# Patient Record
Sex: MALE | Race: WHITE | NOT HISPANIC OR LATINO | Employment: OTHER | URBAN - METROPOLITAN AREA
[De-identification: names, ages, dates, MRNs, and addresses within clinical notes are randomized per-mention and may not be internally consistent; named-entity substitution may affect disease eponyms.]

---

## 2019-06-24 ENCOUNTER — OFFICE VISIT (OUTPATIENT)
Dept: GASTROENTEROLOGY | Facility: CLINIC | Age: 74
End: 2019-06-24
Payer: COMMERCIAL

## 2019-06-24 VITALS
HEIGHT: 73 IN | RESPIRATION RATE: 17 BRPM | BODY MASS INDEX: 25.2 KG/M2 | HEART RATE: 118 BPM | DIASTOLIC BLOOD PRESSURE: 90 MMHG | WEIGHT: 190.13 LBS | SYSTOLIC BLOOD PRESSURE: 138 MMHG | OXYGEN SATURATION: 98 %

## 2019-06-24 DIAGNOSIS — K21.9 GASTROESOPHAGEAL REFLUX DISEASE WITHOUT ESOPHAGITIS: Primary | ICD-10-CM

## 2019-06-24 DIAGNOSIS — Z86.010 HISTORY OF COLON POLYPS: ICD-10-CM

## 2019-06-24 PROBLEM — Z86.0100 HISTORY OF COLON POLYPS: Status: ACTIVE | Noted: 2019-06-24

## 2019-06-24 PROCEDURE — 99999 PR PBB SHADOW E&M-NEW PATIENT-LVL III: ICD-10-PCS | Mod: PBBFAC,,, | Performed by: INTERNAL MEDICINE

## 2019-06-24 PROCEDURE — 1101F PR PT FALLS ASSESS DOC 0-1 FALLS W/OUT INJ PAST YR: ICD-10-PCS | Mod: S$GLB,,, | Performed by: INTERNAL MEDICINE

## 2019-06-24 PROCEDURE — 99203 PR OFFICE/OUTPT VISIT, NEW, LEVL III, 30-44 MIN: ICD-10-PCS | Mod: S$GLB,,, | Performed by: INTERNAL MEDICINE

## 2019-06-24 PROCEDURE — 99999 PR PBB SHADOW E&M-NEW PATIENT-LVL III: CPT | Mod: PBBFAC,,, | Performed by: INTERNAL MEDICINE

## 2019-06-24 PROCEDURE — 1101F PT FALLS ASSESS-DOCD LE1/YR: CPT | Mod: S$GLB,,, | Performed by: INTERNAL MEDICINE

## 2019-06-24 PROCEDURE — 99203 OFFICE O/P NEW LOW 30 MIN: CPT | Mod: S$GLB,,, | Performed by: INTERNAL MEDICINE

## 2019-06-24 RX ORDER — METFORMIN HYDROCHLORIDE 500 MG/1
500 TABLET, EXTENDED RELEASE ORAL 2 TIMES DAILY
COMMUNITY
Start: 2019-04-01 | End: 2022-11-18 | Stop reason: SDUPTHER

## 2019-06-24 RX ORDER — SIMVASTATIN 40 MG/1
1 TABLET, FILM COATED ORAL DAILY
COMMUNITY
Start: 2019-05-29 | End: 2022-11-18 | Stop reason: SDUPTHER

## 2019-06-24 RX ORDER — LOSARTAN POTASSIUM AND HYDROCHLOROTHIAZIDE 12.5; 1 MG/1; MG/1
1 TABLET ORAL DAILY
COMMUNITY
Start: 2019-04-29 | End: 2022-11-18 | Stop reason: SDUPTHER

## 2019-06-24 RX ORDER — AMLODIPINE BESYLATE 5 MG/1
5 TABLET ORAL DAILY
COMMUNITY
Start: 2019-04-29 | End: 2022-11-18 | Stop reason: SDUPTHER

## 2019-06-24 NOTE — PATIENT INSTRUCTIONS
He will continue his ADA diet and current medications.  He will try to pinpoint a reason for the frequent clearing of the throat.  A barium swallow and upper GI series will be obtained. He has had colonoscopies in the past with history of polyps but he denies colon symptoms.  He will decide whether he ever wants of the colonoscopy at his age.

## 2019-06-24 NOTE — PROGRESS NOTES
Subjective:       Patient ID: Deniz Horowitz is a 73 y.o. male.    Chief Complaint: Establish Care    His wife states he has frequent clearing of his throat.  He denies aspiration or cardiopulmonary symptoms.  He has a strong family history of heart disease and hyperlipidemia but he denies cardiopulmonary symptoms.  He states his diabetes is well controlled.  He denies dysphagia jaundice or bleeding.  In the past he has had colonoscopies which revealed hemorrhoids but he states that since he is not having colon symptoms he does not want another colonoscopy.  He denies jaundice GI bleeding dysphagia aspiration.  He has a physically active man who is retired and plays golf.      Allergies:  Review of patient's allergies indicates:  No Known Allergies    Medications:    Current Outpatient Medications:     amLODIPine (NORVASC) 5 MG tablet, Take 5 mg by mouth once daily., Disp: , Rfl:     losartan-hydrochlorothiazide 100-12.5 mg (HYZAAR) 100-12.5 mg Tab, Take 1 tablet by mouth once daily., Disp: , Rfl:     metFORMIN (GLUCOPHAGE-XR) 500 MG 24 hr tablet, Take 500 mg by mouth 2 (two) times daily., Disp: , Rfl:     simvastatin (ZOCOR) 40 MG tablet, Take 1 tablet by mouth once daily., Disp: , Rfl:     History reviewed. No pertinent past medical history.    History reviewed. No pertinent surgical history.      Review of Systems   Constitutional: Negative for appetite change, fever and unexpected weight change.   HENT: Negative for trouble swallowing.         No jaundice.   Respiratory: Negative for cough, shortness of breath and wheezing.         No Rales, Rhonchi, or Dyspnea.   Cardiovascular: Negative for chest pain.        Denies cardiopulmonary symptoms.  He states his hypertension hyperlipidemia well controlled.   Gastrointestinal: Negative for abdominal distention, abdominal pain, anal bleeding, blood in stool, constipation, diarrhea, nausea and rectal pain.        He states that he clears his throat is not  associated with pulmonary symptoms of coughing.  He denies pyrosis aspiration or dysphagia.  He does not associated with the specific food or musculoskeletal action   Musculoskeletal: Negative for back pain and neck pain.   Skin: Negative for pallor and rash.   Neurological: Negative for dizziness, seizures, syncope, speech difficulty, weakness and numbness.   Hematological: Negative for adenopathy.   Psychiatric/Behavioral: Negative for confusion.       Objective:      Physical Exam   Constitutional: He is oriented to person, place, and time. He appears well-developed and well-nourished.   Well-nourished well-hydrated nonicteric white male   HENT:   Head: Normocephalic.   Eyes: Pupils are equal, round, and reactive to light. EOM are normal.   Neck: Normal range of motion. Neck supple. No tracheal deviation present. No thyromegaly present.   Carotid bruits are absent   Cardiovascular: Normal rate, regular rhythm and normal heart sounds.   Pulmonary/Chest: Effort normal and breath sounds normal.   Abdominal: Soft. Bowel sounds are normal. He exhibits no distension and no mass. There is no tenderness. There is no rebound and no guarding. No hernia.   The abdomen is soft mildly obese without tenderness masses or organomegaly.  Bowel sounds are normal. Head   Musculoskeletal: Normal range of motion.   Lymphadenopathy:     He has no cervical adenopathy.   Neurological: He is alert and oriented to person, place, and time. No cranial nerve deficit.   Skin: Skin is warm and dry.   Psychiatric: He has a normal mood and affect. His behavior is normal.   Vitals reviewed.        Plan:       Gastroesophageal reflux disease without esophagitis    History of colon polyps    Other orders  -     FL Upper GI With Small Bowel; Future; Expected date: 06/24/2019

## 2019-07-09 ENCOUNTER — HOSPITAL ENCOUNTER (OUTPATIENT)
Dept: RADIOLOGY | Facility: HOSPITAL | Age: 74
Discharge: HOME | End: 2019-07-09
Attending: INTERNAL MEDICINE
Payer: COMMERCIAL

## 2019-07-09 PROCEDURE — 74245 FL UPPER GI WITH SMALL BOWEL: ICD-10-PCS | Mod: 26,,, | Performed by: RADIOLOGY

## 2019-07-09 PROCEDURE — 74245 FL UPPER GI WITH SMALL BOWEL: CPT | Mod: TC,FY

## 2019-07-09 PROCEDURE — 25500020 PHARM REV CODE 255: Performed by: INTERNAL MEDICINE

## 2019-07-09 PROCEDURE — 74245 FL UPPER GI WITH SMALL BOWEL: CPT | Mod: 26,,, | Performed by: RADIOLOGY

## 2019-07-09 PROCEDURE — A9698 NON-RAD CONTRAST MATERIALNOC: HCPCS | Performed by: INTERNAL MEDICINE

## 2019-07-09 RX ADMIN — BARIUM SULFATE 140 ML: 980 POWDER, FOR SUSPENSION ORAL at 10:07

## 2020-10-02 DIAGNOSIS — E11.9 DIABETES: ICD-10-CM

## 2020-10-02 DIAGNOSIS — E78.5 HYPERLIPIDEMIA: ICD-10-CM

## 2020-10-02 DIAGNOSIS — I10 HYPERTENSION: ICD-10-CM

## 2020-10-02 DIAGNOSIS — R94.31 ECG ABNORMAL: Primary | ICD-10-CM

## 2020-11-10 ENCOUNTER — HOSPITAL ENCOUNTER (OUTPATIENT)
Dept: RADIOLOGY | Facility: HOSPITAL | Age: 75
Discharge: HOME | End: 2020-11-10
Attending: INTERNAL MEDICINE
Payer: MEDICARE

## 2020-11-10 DIAGNOSIS — R94.31 ECG ABNORMAL: ICD-10-CM

## 2020-11-10 DIAGNOSIS — E11.9 DIABETES: ICD-10-CM

## 2020-11-10 DIAGNOSIS — I10 HYPERTENSION: ICD-10-CM

## 2020-11-10 DIAGNOSIS — E78.5 HYPERLIPIDEMIA: ICD-10-CM

## 2020-11-10 PROCEDURE — 71046 XR CHEST PA AND LATERAL: ICD-10-PCS | Mod: 26,,, | Performed by: RADIOLOGY

## 2020-11-10 PROCEDURE — 71046 X-RAY EXAM CHEST 2 VIEWS: CPT | Mod: TC,FY

## 2020-11-10 PROCEDURE — 71046 X-RAY EXAM CHEST 2 VIEWS: CPT | Mod: 26,,, | Performed by: RADIOLOGY

## 2021-02-11 ENCOUNTER — LAB VISIT (OUTPATIENT)
Dept: LAB | Facility: HOSPITAL | Age: 76
End: 2021-02-11
Attending: INTERNAL MEDICINE
Payer: MEDICARE

## 2021-02-11 DIAGNOSIS — E11.9 DIABETES MELLITUS WITHOUT COMPLICATION: Primary | ICD-10-CM

## 2021-02-11 LAB
ESTIMATED AVG GLUCOSE: 146 MG/DL (ref 68–131)
HBA1C MFR BLD: 6.7 % (ref 4.5–6.2)

## 2021-02-11 PROCEDURE — 83036 HEMOGLOBIN GLYCOSYLATED A1C: CPT

## 2021-02-11 PROCEDURE — 36415 COLL VENOUS BLD VENIPUNCTURE: CPT

## 2022-11-18 ENCOUNTER — OFFICE VISIT (OUTPATIENT)
Dept: FAMILY MEDICINE | Facility: CLINIC | Age: 77
End: 2022-11-18
Payer: MEDICARE

## 2022-11-18 VITALS
OXYGEN SATURATION: 98 % | BODY MASS INDEX: 24.41 KG/M2 | WEIGHT: 184.19 LBS | HEIGHT: 73 IN | DIASTOLIC BLOOD PRESSURE: 68 MMHG | SYSTOLIC BLOOD PRESSURE: 130 MMHG | TEMPERATURE: 98 F | RESPIRATION RATE: 16 BRPM | HEART RATE: 78 BPM

## 2022-11-18 DIAGNOSIS — E11.59 HYPERTENSION ASSOCIATED WITH DIABETES: ICD-10-CM

## 2022-11-18 DIAGNOSIS — I15.2 HYPERTENSION ASSOCIATED WITH DIABETES: ICD-10-CM

## 2022-11-18 DIAGNOSIS — Z11.59 ENCOUNTER FOR HEPATITIS C SCREENING TEST FOR LOW RISK PATIENT: ICD-10-CM

## 2022-11-18 DIAGNOSIS — E11.9 TYPE 2 DIABETES MELLITUS WITHOUT COMPLICATION, WITHOUT LONG-TERM CURRENT USE OF INSULIN: ICD-10-CM

## 2022-11-18 DIAGNOSIS — Z11.4 SCREENING FOR HIV (HUMAN IMMUNODEFICIENCY VIRUS): ICD-10-CM

## 2022-11-18 DIAGNOSIS — Z00.00 ANNUAL PHYSICAL EXAM: Primary | ICD-10-CM

## 2022-11-18 DIAGNOSIS — E78.5 HYPERLIPIDEMIA ASSOCIATED WITH TYPE 2 DIABETES MELLITUS: ICD-10-CM

## 2022-11-18 DIAGNOSIS — E11.69 HYPERLIPIDEMIA ASSOCIATED WITH TYPE 2 DIABETES MELLITUS: ICD-10-CM

## 2022-11-18 PROBLEM — I10 ESSENTIAL HYPERTENSION: Status: ACTIVE | Noted: 2022-11-18

## 2022-11-18 PROCEDURE — 99999 PR PBB SHADOW E&M-EST. PATIENT-LVL III: ICD-10-PCS | Mod: PBBFAC,,, | Performed by: FAMILY MEDICINE

## 2022-11-18 PROCEDURE — 99397 PER PM REEVAL EST PAT 65+ YR: CPT | Mod: S$PBB,GZ,, | Performed by: FAMILY MEDICINE

## 2022-11-18 PROCEDURE — 99213 OFFICE O/P EST LOW 20 MIN: CPT | Mod: PBBFAC | Performed by: FAMILY MEDICINE

## 2022-11-18 PROCEDURE — 99999 PR PBB SHADOW E&M-EST. PATIENT-LVL III: CPT | Mod: PBBFAC,,, | Performed by: FAMILY MEDICINE

## 2022-11-18 PROCEDURE — 99397 PR PREVENTIVE VISIT,EST,65 & OVER: ICD-10-PCS | Mod: S$PBB,GZ,, | Performed by: FAMILY MEDICINE

## 2022-11-18 RX ORDER — METFORMIN HYDROCHLORIDE 500 MG/1
500 TABLET, EXTENDED RELEASE ORAL 2 TIMES DAILY
Qty: 180 TABLET | Refills: 3 | Status: SHIPPED | OUTPATIENT
Start: 2022-11-18 | End: 2024-02-15 | Stop reason: SDUPTHER

## 2022-11-18 RX ORDER — AMLODIPINE BESYLATE 5 MG/1
5 TABLET ORAL DAILY
Qty: 90 TABLET | Refills: 3 | Status: SHIPPED | OUTPATIENT
Start: 2022-11-18 | End: 2024-02-15

## 2022-11-18 RX ORDER — SIMVASTATIN 40 MG/1
40 TABLET, FILM COATED ORAL DAILY
Qty: 90 TABLET | Refills: 3 | Status: SHIPPED | OUTPATIENT
Start: 2022-11-18 | End: 2024-01-11 | Stop reason: SDUPTHER

## 2022-11-18 RX ORDER — LOSARTAN POTASSIUM AND HYDROCHLOROTHIAZIDE 12.5; 1 MG/1; MG/1
1 TABLET ORAL DAILY
Qty: 90 TABLET | Refills: 3 | Status: SHIPPED | OUTPATIENT
Start: 2022-11-18 | End: 2024-02-15

## 2022-11-18 NOTE — PROGRESS NOTES
"[facility] - Clinic Note    Subjective      Mr. Horowitz is a 77 y.o. male who presents to clinic for Establish Care    Patient has a history of type 2 diabetes, hypertension, hyperlipidemia.  He currently takes amlodipine, losartan-hydrochlorothiazide, metformin, simvastatin.  Chronic conditions are well controlled.  He has no other complaints at this time.    PMH Deniz has no past medical history on file.   PSXH Deniz has no past surgical history on file.    Deniz's family history is not on file.    Deniz reports that he has been smoking cigarettes. He has never used smokeless tobacco. No history on file for alcohol use and drug use.   ALG Deniz has No Known Allergies.   MED Deniz has a current medication list which includes the following prescription(s): amlodipine, losartan-hydrochlorothiazide 100-12.5 mg, metformin, and simvastatin.     Review of Systems   Constitutional:  Negative for chills and fever.   HENT:  Negative for congestion and rhinorrhea.    Eyes:  Negative for visual disturbance.   Respiratory:  Negative for cough and shortness of breath.    Cardiovascular:  Negative for chest pain.   Gastrointestinal:  Negative for abdominal pain, constipation, diarrhea, nausea and vomiting.   Genitourinary:  Negative for dysuria.   Musculoskeletal:  Negative for myalgias.   Skin:  Negative for rash.   Neurological:  Negative for weakness and headaches.   Objective     /68 (BP Location: Right arm, Patient Position: Sitting, BP Method: Large (Manual))   Pulse 78   Temp 97.6 °F (36.4 °C) (Temporal)   Resp 16   Ht 6' 1" (1.854 m)   Wt 83.6 kg (184 lb 3.2 oz)   SpO2 98%   BMI 24.30 kg/m²     Physical Exam  Vitals and nursing note reviewed.   Constitutional:       General: He is not in acute distress.     Appearance: Normal appearance. He is well-developed. He is not diaphoretic.   HENT:      Head: Normocephalic and atraumatic.      Right Ear: External ear normal.      Left Ear: " External ear normal.   Eyes:      General:         Right eye: No discharge.         Left eye: No discharge.   Cardiovascular:      Rate and Rhythm: Normal rate and regular rhythm.      Heart sounds: Normal heart sounds.   Pulmonary:      Effort: Pulmonary effort is normal.      Breath sounds: Normal breath sounds. No wheezing or rales.   Skin:     General: Skin is warm and dry.   Neurological:      Mental Status: He is alert and oriented to person, place, and time. Mental status is at baseline.   Psychiatric:         Mood and Affect: Mood normal.         Behavior: Behavior normal.         Thought Content: Thought content normal.         Judgment: Judgment normal.      Assessment/Plan     1. Annual physical exam        2. Type 2 diabetes mellitus without complication, without long-term current use of insulin  Lipid Panel    Comprehensive Metabolic Panel    CBC Auto Differential    Hemoglobin A1C    metFORMIN (GLUCOPHAGE-XR) 500 MG ER 24hr tablet      3. Screening for HIV (human immunodeficiency virus)  HIV 1/2 Ag/Ab (4th Gen)      4. Encounter for hepatitis C screening test for low risk patient  Hepatitis C Antibody      5. Hypertension associated with diabetes  losartan-hydrochlorothiazide 100-12.5 mg (HYZAAR) 100-12.5 mg Tab    amLODIPine (NORVASC) 5 MG tablet      6. Hyperlipidemia associated with type 2 diabetes mellitus  simvastatin (ZOCOR) 40 MG tablet        Continue current medications as prescribed.  Check labs as above.  Follow-up in 6 months.    No future appointments.      Warner Phillips MD  Family Medicine  [facility]

## 2023-01-04 ENCOUNTER — OFFICE VISIT (OUTPATIENT)
Dept: FAMILY MEDICINE | Facility: CLINIC | Age: 78
End: 2023-01-04
Payer: MEDICARE

## 2023-01-04 VITALS
HEART RATE: 112 BPM | RESPIRATION RATE: 16 BRPM | TEMPERATURE: 98 F | BODY MASS INDEX: 24.47 KG/M2 | DIASTOLIC BLOOD PRESSURE: 68 MMHG | SYSTOLIC BLOOD PRESSURE: 118 MMHG | HEIGHT: 73 IN | WEIGHT: 184.63 LBS | OXYGEN SATURATION: 96 %

## 2023-01-04 DIAGNOSIS — L89.321 PRESSURE INJURY OF LEFT BUTTOCK, STAGE 1: Primary | ICD-10-CM

## 2023-01-04 PROCEDURE — 99213 OFFICE O/P EST LOW 20 MIN: CPT | Mod: S$PBB,,, | Performed by: FAMILY MEDICINE

## 2023-01-04 PROCEDURE — 99999 PR PBB SHADOW E&M-EST. PATIENT-LVL III: CPT | Mod: PBBFAC,,, | Performed by: FAMILY MEDICINE

## 2023-01-04 PROCEDURE — 99213 PR OFFICE/OUTPT VISIT, EST, LEVL III, 20-29 MIN: ICD-10-PCS | Mod: S$PBB,,, | Performed by: FAMILY MEDICINE

## 2023-01-04 PROCEDURE — 99999 PR PBB SHADOW E&M-EST. PATIENT-LVL III: ICD-10-PCS | Mod: PBBFAC,,, | Performed by: FAMILY MEDICINE

## 2023-01-04 PROCEDURE — 99213 OFFICE O/P EST LOW 20 MIN: CPT | Mod: PBBFAC | Performed by: FAMILY MEDICINE

## 2023-01-04 RX ORDER — MUPIROCIN 20 MG/G
OINTMENT TOPICAL 3 TIMES DAILY
Qty: 30 G | Refills: 0 | Status: SHIPPED | OUTPATIENT
Start: 2023-01-04 | End: 2023-07-03

## 2023-01-04 NOTE — PROGRESS NOTES
"[facility] - Clinic Note    Subjective      Mr. Horowitz is a 77 y.o. male who presents to clinic for Rash    Pressure injury to the left buttock.    PMH Deniz has no past medical history on file.   PSXH Deniz has no past surgical history on file.    Deniz's family history is not on file.    Deniz reports that he has been smoking cigarettes. He has never used smokeless tobacco. No history on file for alcohol use and drug use.   [facility] Deniz has No Known Allergies.   MED Deniz has a current medication list which includes the following prescription(s): amlodipine, losartan-hydrochlorothiazide 100-12.5 mg, metformin, simvastatin, and mupirocin.     Review of Systems   Constitutional:  Negative for chills and fever.   HENT:  Negative for congestion and rhinorrhea.    Eyes:  Negative for visual disturbance.   Respiratory:  Negative for cough and shortness of breath.    Cardiovascular:  Negative for chest pain.   Gastrointestinal:  Negative for abdominal pain, constipation, diarrhea, nausea and vomiting.   Genitourinary:  Negative for dysuria.   Musculoskeletal:  Negative for myalgias.   Skin:  Negative for rash.   Neurological:  Negative for weakness and headaches.   Objective     /68 (BP Location: Left arm, Patient Position: Sitting, BP Method: Large (Manual))   Pulse (!) 112   Temp 97.6 °F (36.4 °C) (Temporal)   Resp 16   Ht 6' 1" (1.854 m)   Wt 83.7 kg (184 lb 9.6 oz)   SpO2 96%   BMI 24.36 kg/m²     Physical Exam  Vitals and nursing note reviewed. Exam conducted with a chaperone present.   Constitutional:       General: He is not in acute distress.     Appearance: Normal appearance. He is well-developed. He is not diaphoretic.   HENT:      Head: Normocephalic and atraumatic.      Right Ear: External ear normal.      Left Ear: External ear normal.   Eyes:      General:         Right eye: No discharge.         Left eye: No discharge.   Cardiovascular:      Rate and Rhythm: Normal rate and " regular rhythm.      Heart sounds: Normal heart sounds.   Pulmonary:      Effort: Pulmonary effort is normal.      Breath sounds: Normal breath sounds. No wheezing or rales.   Skin:     General: Skin is warm and dry.      Comments: stage I pressure injury to left buttock   Neurological:      Mental Status: He is alert and oriented to person, place, and time. Mental status is at baseline.   Psychiatric:         Mood and Affect: Mood normal.         Behavior: Behavior normal.         Thought Content: Thought content normal.         Judgment: Judgment normal.      Assessment/Plan     1. Pressure injury of left buttock, stage 1          Does not be infected currently.  Will have patient but the Alejandra on the affected area and Calmoseptine.  Keep pressure off area.  Follow-up if symptoms fail to improve.    No future appointments.      Warner Phillips MD  Family Medicine  [facility]

## 2023-06-30 ENCOUNTER — TELEPHONE (OUTPATIENT)
Dept: FAMILY MEDICINE | Facility: CLINIC | Age: 78
End: 2023-06-30
Payer: MEDICARE

## 2023-06-30 NOTE — TELEPHONE ENCOUNTER
----- Message from Medina Villavicencio sent at 6/30/2023 10:53 AM CDT -----  Contact: Gentry  Type:  Sooner Appointment Request    Caller is requesting a sooner appointment.  Caller declined first available appointment listed below.  Caller will not accept being placed on the waitlist and is requesting a message be sent to doctor.    Name of Caller:  Gentry/ PT Son  When is the first available appointment?  7/28/23  Symptoms:  Possible Boil or Cyst on Left Forearm  Best Call Back Number:  [phone]  Additional Information:  PT son asking for him to be seen next week if possible

## 2023-07-03 ENCOUNTER — OFFICE VISIT (OUTPATIENT)
Dept: FAMILY MEDICINE | Facility: CLINIC | Age: 78
End: 2023-07-03
Payer: MEDICARE

## 2023-07-03 VITALS
BODY MASS INDEX: 25.02 KG/M2 | WEIGHT: 188.81 LBS | RESPIRATION RATE: 16 BRPM | HEIGHT: 73 IN | DIASTOLIC BLOOD PRESSURE: 78 MMHG | OXYGEN SATURATION: 96 % | SYSTOLIC BLOOD PRESSURE: 120 MMHG | TEMPERATURE: 98 F | HEART RATE: 100 BPM

## 2023-07-03 DIAGNOSIS — E11.59 HYPERTENSION ASSOCIATED WITH DIABETES: ICD-10-CM

## 2023-07-03 DIAGNOSIS — L08.9 SKIN INFECTION: Primary | ICD-10-CM

## 2023-07-03 DIAGNOSIS — N18.31 STAGE 3A CHRONIC KIDNEY DISEASE: ICD-10-CM

## 2023-07-03 DIAGNOSIS — I15.2 HYPERTENSION ASSOCIATED WITH DIABETES: ICD-10-CM

## 2023-07-03 PROCEDURE — 99214 OFFICE O/P EST MOD 30 MIN: CPT | Mod: S$PBB,,, | Performed by: FAMILY MEDICINE

## 2023-07-03 PROCEDURE — 99214 PR OFFICE/OUTPT VISIT, EST, LEVL IV, 30-39 MIN: ICD-10-PCS | Mod: S$PBB,,, | Performed by: FAMILY MEDICINE

## 2023-07-03 PROCEDURE — 99999 PR PBB SHADOW E&M-EST. PATIENT-LVL IV: ICD-10-PCS | Mod: PBBFAC,,, | Performed by: FAMILY MEDICINE

## 2023-07-03 PROCEDURE — 99999 PR PBB SHADOW E&M-EST. PATIENT-LVL IV: CPT | Mod: PBBFAC,,, | Performed by: FAMILY MEDICINE

## 2023-07-03 PROCEDURE — 99214 OFFICE O/P EST MOD 30 MIN: CPT | Mod: PBBFAC | Performed by: FAMILY MEDICINE

## 2023-07-03 RX ORDER — DOXYCYCLINE HYCLATE 100 MG
100 TABLET ORAL 2 TIMES DAILY
Qty: 20 TABLET | Refills: 0 | Status: SHIPPED | OUTPATIENT
Start: 2023-07-03 | End: 2023-07-13

## 2023-07-03 NOTE — PROGRESS NOTES
"[facility] - Clinic Note    Subjective      Mr. Horowitz is a 77 y.o. male who presents to clinic for Abscess    Right forearm skin infection.    PMH Deniz has no past medical history on file.   PSXH Deniz has no past surgical history on file.    Deniz's family history is not on file.    Deniz reports that he has been smoking cigarettes. He has never used smokeless tobacco. No history on file for alcohol use and drug use.   [facility] Deniz has No Known Allergies.   MED Deniz has a current medication list which includes the following prescription(s): amlodipine, losartan-hydrochlorothiazide 100-12.5 mg, metformin, simvastatin, and doxycycline.     Review of Systems   Constitutional:  Negative for chills and fever.   HENT:  Negative for congestion and rhinorrhea.    Eyes:  Negative for visual disturbance.   Respiratory:  Negative for cough and shortness of breath.    Cardiovascular:  Negative for chest pain.   Gastrointestinal:  Negative for abdominal pain, constipation, diarrhea, nausea and vomiting.   Genitourinary:  Negative for dysuria.   Musculoskeletal:  Negative for myalgias.   Skin:  Positive for color change. Negative for rash.   Neurological:  Negative for weakness and headaches.   Objective     /78 (BP Location: Left arm, Patient Position: Sitting, BP Method: Large (Manual))   Pulse 100   Temp 98.3 °F (36.8 °C) (Temporal)   Resp 16   Ht 6' 1" (1.854 m)   Wt 85.6 kg (188 lb 12.8 oz)   SpO2 96%   BMI 24.91 kg/m²     Physical Exam  Vitals and nursing note reviewed.   Constitutional:       General: He is not in acute distress.     Appearance: Normal appearance. He is well-developed. He is not diaphoretic.   HENT:      Head: Normocephalic and atraumatic.      Right Ear: External ear normal.      Left Ear: External ear normal.   Eyes:      General:         Right eye: No discharge.         Left eye: No discharge.   Cardiovascular:      Rate and Rhythm: Normal rate and regular rhythm.      " Heart sounds: Normal heart sounds.   Pulmonary:      Effort: Pulmonary effort is normal.      Breath sounds: Normal breath sounds. No wheezing or rales.   Skin:     General: Skin is warm and dry.      Comments: Right forearm skin infection   Neurological:      Mental Status: He is alert and oriented to person, place, and time. Mental status is at baseline.   Psychiatric:         Mood and Affect: Mood normal.         Behavior: Behavior normal.         Thought Content: Thought content normal.         Judgment: Judgment normal.      Assessment/Plan     1. Skin infection  doxycycline (VIBRA-TABS) 100 MG tablet      2. Hypertension associated with diabetes        3. Stage 3a chronic kidney disease          Expressed pus from area of infection.  Will treat with doxycycline.  Follow-up if symptoms do not improve.  TNeeds to obtain lab work for the above conditions.    Future Appointments   Date Time Provider Department Center   7/20/2023 10:00 AM Warner Phillips MD [facility]         Warner Phillips MD  Family Medicine  [facility]

## 2023-07-20 ENCOUNTER — OFFICE VISIT (OUTPATIENT)
Dept: FAMILY MEDICINE | Facility: CLINIC | Age: 78
End: 2023-07-20
Payer: MEDICARE

## 2023-07-20 VITALS
RESPIRATION RATE: 16 BRPM | BODY MASS INDEX: 25.27 KG/M2 | DIASTOLIC BLOOD PRESSURE: 68 MMHG | OXYGEN SATURATION: 97 % | WEIGHT: 190.63 LBS | HEIGHT: 73 IN | HEART RATE: 104 BPM | SYSTOLIC BLOOD PRESSURE: 132 MMHG | TEMPERATURE: 99 F

## 2023-07-20 DIAGNOSIS — L98.9 SKIN LESION: Primary | ICD-10-CM

## 2023-07-20 PROCEDURE — 99214 OFFICE O/P EST MOD 30 MIN: CPT | Mod: PBBFAC | Performed by: FAMILY MEDICINE

## 2023-07-20 PROCEDURE — 99999 PR PBB SHADOW E&M-EST. PATIENT-LVL IV: ICD-10-PCS | Mod: PBBFAC,,, | Performed by: FAMILY MEDICINE

## 2023-07-20 PROCEDURE — 99213 OFFICE O/P EST LOW 20 MIN: CPT | Mod: S$PBB,,, | Performed by: FAMILY MEDICINE

## 2023-07-20 PROCEDURE — 99999 PR PBB SHADOW E&M-EST. PATIENT-LVL IV: CPT | Mod: PBBFAC,,, | Performed by: FAMILY MEDICINE

## 2023-07-20 PROCEDURE — 99213 PR OFFICE/OUTPT VISIT, EST, LEVL III, 20-29 MIN: ICD-10-PCS | Mod: S$PBB,,, | Performed by: FAMILY MEDICINE

## 2023-07-21 NOTE — PROGRESS NOTES
"[facility] - Clinic Note    Subjective      Mr. Horowitz is a 77 y.o. male who presents to clinic for Follow-up (2wk follow up)    Right forearm skin infection.    PMH Deniz has no past medical history on file.   PSXH Deniz has no past surgical history on file.    Deniz's family history is not on file.    Deniz reports that he has been smoking cigarettes. He has never used smokeless tobacco. No history on file for alcohol use and drug use.   [facility] Deniz has No Known Allergies.   MED Deniz has a current medication list which includes the following prescription(s): amlodipine, losartan-hydrochlorothiazide 100-12.5 mg, metformin, and simvastatin.     Review of Systems   Constitutional:  Negative for chills and fever.   HENT:  Negative for congestion and rhinorrhea.    Eyes:  Negative for visual disturbance.   Respiratory:  Negative for cough and shortness of breath.    Cardiovascular:  Negative for chest pain.   Gastrointestinal:  Negative for abdominal pain, constipation, diarrhea, nausea and vomiting.   Genitourinary:  Negative for dysuria.   Musculoskeletal:  Negative for myalgias.   Skin:  Positive for color change. Negative for rash.   Neurological:  Negative for weakness and headaches.   Objective     /68 (BP Location: Left arm, Patient Position: Sitting, BP Method: Large (Manual))   Pulse 104   Temp 98.5 °F (36.9 °C) (Temporal)   Resp 16   Ht 6' 1" (1.854 m)   Wt 86.5 kg (190 lb 9.6 oz)   SpO2 97%   BMI 25.15 kg/m²     Physical Exam  Vitals and nursing note reviewed.   Constitutional:       General: He is not in acute distress.     Appearance: Normal appearance. He is well-developed. He is not diaphoretic.   HENT:      Head: Normocephalic and atraumatic.      Right Ear: External ear normal.      Left Ear: External ear normal.   Eyes:      General:         Right eye: No discharge.         Left eye: No discharge.   Cardiovascular:      Rate and Rhythm: Normal rate and regular rhythm. "      Heart sounds: Normal heart sounds.   Pulmonary:      Effort: Pulmonary effort is normal.      Breath sounds: Normal breath sounds. No wheezing or rales.   Skin:     General: Skin is warm and dry.      Comments: Right forearm skin infection   Neurological:      Mental Status: He is alert and oriented to person, place, and time. Mental status is at baseline.   Psychiatric:         Mood and Affect: Mood normal.         Behavior: Behavior normal.         Thought Content: Thought content normal.         Judgment: Judgment normal.      Assessment/Plan     1. Skin lesion  Ambulatory referral/consult to Dermatology        Lesions still present.  Attempted incision and drainage with minimal pus production.  Needs to see Dermatology for biopsy.  Referral has been placed.    No future appointments.        Warner Phillips MD  Family Medicine  [facility]

## 2024-01-04 ENCOUNTER — OFFICE VISIT (OUTPATIENT)
Dept: FAMILY MEDICINE | Facility: CLINIC | Age: 79
End: 2024-01-04
Payer: MEDICARE

## 2024-01-04 ENCOUNTER — LAB VISIT (OUTPATIENT)
Dept: LAB | Facility: CLINIC | Age: 79
End: 2024-01-04
Payer: MEDICARE

## 2024-01-04 VITALS
OXYGEN SATURATION: 94 % | BODY MASS INDEX: 24.77 KG/M2 | SYSTOLIC BLOOD PRESSURE: 92 MMHG | HEART RATE: 87 BPM | WEIGHT: 186.88 LBS | HEIGHT: 73 IN | RESPIRATION RATE: 12 BRPM | DIASTOLIC BLOOD PRESSURE: 60 MMHG

## 2024-01-04 DIAGNOSIS — E78.5 HYPERLIPIDEMIA ASSOCIATED WITH TYPE 2 DIABETES MELLITUS: ICD-10-CM

## 2024-01-04 DIAGNOSIS — R00.0 TACHYARRHYTHMIA: ICD-10-CM

## 2024-01-04 DIAGNOSIS — E78.2 MIXED HYPERLIPIDEMIA: ICD-10-CM

## 2024-01-04 DIAGNOSIS — R73.9 ELEVATED BLOOD SUGAR: ICD-10-CM

## 2024-01-04 DIAGNOSIS — Z11.59 ENCOUNTER FOR HEPATITIS C SCREENING TEST FOR LOW RISK PATIENT: ICD-10-CM

## 2024-01-04 DIAGNOSIS — I10 ESSENTIAL HYPERTENSION, BENIGN: Primary | ICD-10-CM

## 2024-01-04 DIAGNOSIS — R00.0 TACHYCARDIA: ICD-10-CM

## 2024-01-04 DIAGNOSIS — I10 ESSENTIAL HYPERTENSION, BENIGN: ICD-10-CM

## 2024-01-04 DIAGNOSIS — E11.69 HYPERLIPIDEMIA ASSOCIATED WITH TYPE 2 DIABETES MELLITUS: ICD-10-CM

## 2024-01-04 DIAGNOSIS — I95.2 HYPOTENSION DUE TO DRUGS: ICD-10-CM

## 2024-01-04 DIAGNOSIS — E11.65 INADEQUATELY CONTROLLED DIABETES MELLITUS: ICD-10-CM

## 2024-01-04 PROCEDURE — 99215 OFFICE O/P EST HI 40 MIN: CPT | Mod: S$GLB,,, | Performed by: INTERNAL MEDICINE

## 2024-01-04 PROCEDURE — 80053 COMPREHEN METABOLIC PANEL: CPT | Performed by: INTERNAL MEDICINE

## 2024-01-04 PROCEDURE — 93010 ELECTROCARDIOGRAM REPORT: CPT | Mod: S$PBB,,, | Performed by: INTERNAL MEDICINE

## 2024-01-04 PROCEDURE — 93005 ELECTROCARDIOGRAM TRACING: CPT | Mod: S$GLB,,, | Performed by: INTERNAL MEDICINE

## 2024-01-04 PROCEDURE — 86803 HEPATITIS C AB TEST: CPT | Performed by: INTERNAL MEDICINE

## 2024-01-04 PROCEDURE — 80061 LIPID PANEL: CPT | Performed by: INTERNAL MEDICINE

## 2024-01-04 PROCEDURE — 36415 COLL VENOUS BLD VENIPUNCTURE: CPT | Mod: ,,, | Performed by: INTERNAL MEDICINE

## 2024-01-04 PROCEDURE — 83036 HEMOGLOBIN GLYCOSYLATED A1C: CPT | Performed by: INTERNAL MEDICINE

## 2024-01-04 PROCEDURE — 82043 UR ALBUMIN QUANTITATIVE: CPT | Performed by: INTERNAL MEDICINE

## 2024-01-04 RX ORDER — TACROLIMUS 1 MG/G
1 OINTMENT TOPICAL 2 TIMES DAILY
COMMUNITY
Start: 2023-08-24

## 2024-01-04 RX ORDER — KETOCONAZOLE 20 MG/ML
1 SHAMPOO, SUSPENSION TOPICAL
COMMUNITY
Start: 2023-08-21

## 2024-01-04 NOTE — PROGRESS NOTES
Subjective:       Patient ID: Deniz Horowitz is a 78 y.o. male.    Chief Complaint: Establish Care, Health Maintenance, and Medication Refill      Patient is established already .......... presents today for a f/u visit , to discuss labs results and for management of the chronic conditions.        Medication Refill  This is a chronic problem. The current episode started more than 1 year ago. The problem occurs intermittently. The problem has been gradually worsening. Pertinent negatives include no fever. Nothing aggravates the symptoms. Treatments tried: See MAR.     Review of Systems   Constitutional:  Negative for activity change, appetite change and fever.   Respiratory: Negative.     Cardiovascular: Negative.    Gastrointestinal: Negative.    Musculoskeletal: Negative.          Objective:      Physical Exam  Vitals and nursing note reviewed.   Constitutional:       Appearance: Normal appearance. He is ill-appearing.   Cardiovascular:      Rate and Rhythm: Regular rhythm. Tachycardia present.      Pulses: Normal pulses.      Heart sounds: Normal heart sounds. No murmur heard.     No friction rub. No gallop.   Pulmonary:      Effort: Pulmonary effort is normal.      Breath sounds: Normal breath sounds. No wheezing.   Skin:     General: Skin is warm and dry.      Comments: Scattered superficial bruising and scabs over L eye   Neurological:      Mental Status: He is alert.   Psychiatric:         Mood and Affect: Mood normal.         Assessment:       1. Essential hypertension, benign  -     Comprehensive Metabolic Panel; Future; Expected date: 01/04/2024  -     Microalbumin/Creatinine Ratio, Urine; Future; Expected date: 01/04/2024    2. Mixed hyperlipidemia  -     Lipid Panel; Future; Expected date: 01/04/2024    3. Encounter for hepatitis C screening test for low risk patient  -     Hepatitis C Antibody; Future; Expected date: 01/04/2024    4. Elevated blood sugar  -     Hemoglobin A1C; Standing    5.  Tachyarrhythmia  -     EKG 12-lead    6. Hypotension due to drugs  Overview:  Secondary to hypovolemia  Recent fall    Assessment & Plan:  Hold all meds  Advised to go to ER but he declined  Inc intake of water  Hold ETOH consumption  Get labs today  RTC in 1 week or less        7. Hyperlipidemia associated with type 2 diabetes mellitus  Overview:  On zocor    Assessment & Plan:  Hold statin till we check LFTs  Recheck lipids      8. Inadequately controlled diabetes mellitus  Overview:  On metformin    Assessment & Plan:  Get renal fx  Hold metformin till then             Plan:       1. Essential hypertension, benign  -     Comprehensive Metabolic Panel; Future; Expected date: 01/04/2024  -     Microalbumin/Creatinine Ratio, Urine; Future; Expected date: 01/04/2024    2. Mixed hyperlipidemia  -     Lipid Panel; Future; Expected date: 01/04/2024    3. Encounter for hepatitis C screening test for low risk patient  -     Hepatitis C Antibody; Future; Expected date: 01/04/2024    4. Elevated blood sugar  -     Hemoglobin A1C; Standing    5. Tachyarrhythmia  -     EKG 12-lead    6. Hypotension due to drugs  Overview:  Secondary to hypovolemia  Recent fall    Assessment & Plan:  Hold all meds  Advised to go to ER but he declined  Inc intake of water  Hold ETOH consumption  Get labs today  RTC in 1 week or less        7. Hyperlipidemia associated with type 2 diabetes mellitus  Overview:  On zocor    Assessment & Plan:  Hold statin till we check LFTs  Recheck lipids      8. Inadequately controlled diabetes mellitus  Overview:  On metformin    Assessment & Plan:  Get renal fx  Hold metformin till then          I spent a total of 40 minutes on the day of the visit.  This includes face to face time and non-face to face time preparing to see the patient (eg, review of tests), obtaining and/or reviewing separately obtained history, documenting clinical information in the electronic or other health record, independently  interpreting results and communicating results to the patient/family/caregiver, or care coordinator.

## 2024-01-04 NOTE — ASSESSMENT & PLAN NOTE
Hold all meds  Advised to go to ER but he declined  Inc intake of water  Hold ETOH consumption  Get labs today  RTC in 1 week or less

## 2024-01-05 LAB
ALBUMIN SERPL BCP-MCNC: 3.8 G/DL (ref 3.5–5.2)
ALBUMIN/CREAT UR: 61.8 UG/MG (ref 0–30)
ALP SERPL-CCNC: 107 U/L (ref 55–135)
ALT SERPL W/O P-5'-P-CCNC: 40 U/L (ref 10–44)
ANION GAP SERPL CALC-SCNC: 12 MMOL/L (ref 8–16)
AST SERPL-CCNC: 32 U/L (ref 10–40)
BILIRUB SERPL-MCNC: 1.3 MG/DL (ref 0.1–1)
BUN SERPL-MCNC: 21 MG/DL (ref 8–23)
CALCIUM SERPL-MCNC: 9.3 MG/DL (ref 8.7–10.5)
CHLORIDE SERPL-SCNC: 99 MMOL/L (ref 95–110)
CHOLEST SERPL-MCNC: 108 MG/DL (ref 120–199)
CHOLEST/HDLC SERPL: 2.9 {RATIO} (ref 2–5)
CO2 SERPL-SCNC: 23 MMOL/L (ref 23–29)
CREAT SERPL-MCNC: 1.8 MG/DL (ref 0.5–1.4)
CREAT UR-MCNC: 110 MG/DL (ref 23–375)
EST. GFR  (NO RACE VARIABLE): 38.1 ML/MIN/1.73 M^2
ESTIMATED AVG GLUCOSE: 266 MG/DL (ref 68–131)
GLUCOSE SERPL-MCNC: 320 MG/DL (ref 70–110)
HBA1C MFR BLD: 10.9 % (ref 4–5.6)
HDLC SERPL-MCNC: 37 MG/DL (ref 40–75)
HDLC SERPL: 34.3 % (ref 20–50)
LDLC SERPL CALC-MCNC: 48.2 MG/DL (ref 63–159)
MICROALBUMIN UR DL<=1MG/L-MCNC: 68 UG/ML
NONHDLC SERPL-MCNC: 71 MG/DL
POTASSIUM SERPL-SCNC: 4.6 MMOL/L (ref 3.5–5.1)
PROT SERPL-MCNC: 9 G/DL (ref 6–8.4)
SODIUM SERPL-SCNC: 134 MMOL/L (ref 136–145)
TRIGL SERPL-MCNC: 114 MG/DL (ref 30–150)

## 2024-01-06 LAB — HCV AB SERPL QL IA: NORMAL

## 2024-01-11 ENCOUNTER — LAB VISIT (OUTPATIENT)
Dept: LAB | Facility: CLINIC | Age: 79
End: 2024-01-11
Payer: MEDICARE

## 2024-01-11 ENCOUNTER — OFFICE VISIT (OUTPATIENT)
Dept: FAMILY MEDICINE | Facility: CLINIC | Age: 79
End: 2024-01-11
Payer: MEDICARE

## 2024-01-11 VITALS
OXYGEN SATURATION: 97 % | HEIGHT: 72 IN | HEART RATE: 120 BPM | DIASTOLIC BLOOD PRESSURE: 68 MMHG | WEIGHT: 185.81 LBS | BODY MASS INDEX: 25.17 KG/M2 | SYSTOLIC BLOOD PRESSURE: 118 MMHG

## 2024-01-11 DIAGNOSIS — R00.0 TACHYCARDIA: ICD-10-CM

## 2024-01-11 DIAGNOSIS — E11.59 HYPERTENSION ASSOCIATED WITH DIABETES: ICD-10-CM

## 2024-01-11 DIAGNOSIS — I15.2 HYPERTENSION ASSOCIATED WITH DIABETES: ICD-10-CM

## 2024-01-11 DIAGNOSIS — E78.5 HYPERLIPIDEMIA ASSOCIATED WITH TYPE 2 DIABETES MELLITUS: ICD-10-CM

## 2024-01-11 DIAGNOSIS — E11.69 HYPERLIPIDEMIA ASSOCIATED WITH TYPE 2 DIABETES MELLITUS: ICD-10-CM

## 2024-01-11 DIAGNOSIS — R00.0 TACHYCARDIA: Primary | ICD-10-CM

## 2024-01-11 DIAGNOSIS — N18.32 STAGE 3B CHRONIC KIDNEY DISEASE: ICD-10-CM

## 2024-01-11 DIAGNOSIS — E11.65 INADEQUATELY CONTROLLED DIABETES MELLITUS: ICD-10-CM

## 2024-01-11 LAB — TSH SERPL DL<=0.005 MIU/L-ACNC: 1.02 UIU/ML (ref 0.4–4)

## 2024-01-11 PROCEDURE — 99214 OFFICE O/P EST MOD 30 MIN: CPT | Mod: S$GLB,,, | Performed by: INTERNAL MEDICINE

## 2024-01-11 PROCEDURE — 84443 ASSAY THYROID STIM HORMONE: CPT | Performed by: INTERNAL MEDICINE

## 2024-01-11 PROCEDURE — 36415 COLL VENOUS BLD VENIPUNCTURE: CPT | Mod: ,,, | Performed by: INTERNAL MEDICINE

## 2024-01-11 RX ORDER — SIMVASTATIN 40 MG/1
40 TABLET, FILM COATED ORAL DAILY
Qty: 90 TABLET | Refills: 3 | Status: SHIPPED | OUTPATIENT
Start: 2024-01-11 | End: 2025-01-10

## 2024-01-11 RX ORDER — METOPROLOL TARTRATE 25 MG/1
25 TABLET, FILM COATED ORAL 2 TIMES DAILY
Qty: 60 TABLET | Refills: 11 | Status: SHIPPED | OUTPATIENT
Start: 2024-01-11 | End: 2025-01-10

## 2024-01-11 RX ORDER — INSULIN GLARGINE 100 [IU]/ML
15 INJECTION, SOLUTION SUBCUTANEOUS NIGHTLY
Qty: 15 ML | Refills: 2 | Status: SHIPPED | OUTPATIENT
Start: 2024-01-11 | End: 2024-11-06

## 2024-01-11 RX ORDER — BLOOD SUGAR DIAGNOSTIC
1 STRIP MISCELLANEOUS NIGHTLY
Qty: 100 EACH | Refills: 2 | Status: SHIPPED | OUTPATIENT
Start: 2024-01-11 | End: 2024-11-06

## 2024-01-11 NOTE — ASSESSMENT & PLAN NOTE
Hold metformin b/o renal insuff  Start low dose lantus at night  Check BS daily  Inc insulin by few units weekly for FBS over 120-150

## 2024-01-11 NOTE — PROGRESS NOTES
Subjective:       Patient ID: Deniz Horowitz is a 78 y.o. male.    Chief Complaint: Diabetes and Hypertension (And refills)      Patient is established already .......... presents today for a f/u visit , to discuss labs results and for management of the chronic conditions.        Diabetes  He presents for his follow-up diabetic visit. He has type 2 diabetes mellitus. No MedicAlert identification noted. His disease course has been worsening. There are no hypoglycemic associated symptoms. Associated symptoms include fatigue, polyuria and weakness. There are no hypoglycemic complications. Diabetic complications include heart disease. Risk factors for coronary artery disease include diabetes mellitus, dyslipidemia, male sex, sedentary lifestyle, stress and hypertension. Current diabetic treatment includes oral agent (monotherapy). He is compliant with treatment most of the time. He is following a generally unhealthy diet. When asked about meal planning, he reported none. He has not had a previous visit with a dietitian. He rarely participates in exercise. His home blood glucose trend is increasing rapidly. An ACE inhibitor/angiotensin II receptor blocker is being taken.   Hypertension      Review of Systems   Constitutional:  Positive for fatigue. Negative for activity change, appetite change and fever.   Respiratory: Negative.     Cardiovascular: Negative.    Gastrointestinal: Negative.    Endocrine: Positive for polyuria.   Musculoskeletal: Negative.    Neurological:  Positive for weakness.         Objective:      Physical Exam  Vitals and nursing note reviewed.   Constitutional:       Appearance: Normal appearance.   Cardiovascular:      Rate and Rhythm: Normal rate and regular rhythm.      Pulses: Normal pulses.      Heart sounds: Normal heart sounds. No murmur heard.     No friction rub. No gallop.   Pulmonary:      Effort: Pulmonary effort is normal.      Breath sounds: Normal breath sounds. No wheezing.  "  Abdominal:      General: Abdomen is flat. Bowel sounds are normal.      Palpations: Abdomen is soft.   Musculoskeletal:         General: Normal range of motion.   Skin:     General: Skin is warm and dry.   Neurological:      General: No focal deficit present.      Mental Status: He is alert and oriented to person, place, and time.   Psychiatric:      Comments: Depressed mood         Assessment:       1. Tachycardia  Overview:  Sec to hypovolemia    Assessment & Plan:  Hydration d/w pt  D/c HCZ  Dec ETOH consumption  Monitor closely    Orders:  -     TSH; Future; Expected date: 01/11/2024    2. Stage 3b chronic kidney disease    3. Hyperlipidemia associated with type 2 diabetes mellitus  Overview:  On zocor    Assessment & Plan:  At goal  Refill statin    Orders:  -     simvastatin (ZOCOR) 40 MG tablet; Take 1 tablet (40 mg total) by mouth once daily.  Dispense: 90 tablet; Refill: 3    4. Hypertension associated with diabetes  Overview:  Hold norvasc & ARB , HCZ    Assessment & Plan:  Start low dose B blocker instead  Monitor GFR        5. Inadequately controlled diabetes mellitus  Overview:  On metformin    Assessment & Plan:  Hold metformin b/o renal insuff  Start low dose lantus at night  Check BS daily  Inc insulin by few units weekly for FBS over 120-150      Other orders  -     insulin glargine (LANTUS U-100 INSULIN) 100 unit/mL injection; Inject 15 Units into the skin every evening.  Dispense: 15 mL; Refill: 2  -     metoprolol tartrate (LOPRESSOR) 25 MG tablet; Take 1 tablet (25 mg total) by mouth 2 (two) times daily.  Dispense: 60 tablet; Refill: 11  -     pen needle, diabetic (NOVOFINE 32) 32 gauge x 1/4" Ndle; 1 Units by Misc.(Non-Drug; Combo Route) route every evening.  Dispense: 100 each; Refill: 2           Plan:       1. Tachycardia  Overview:  Sec to hypovolemia    Assessment & Plan:  Hydration d/w pt  D/c HCZ  Dec ETOH consumption  Monitor closely    Orders:  -     TSH; Future; Expected date: " "01/11/2024    2. Stage 3b chronic kidney disease    3. Hyperlipidemia associated with type 2 diabetes mellitus  Overview:  On zocor    Assessment & Plan:  At goal  Refill statin    Orders:  -     simvastatin (ZOCOR) 40 MG tablet; Take 1 tablet (40 mg total) by mouth once daily.  Dispense: 90 tablet; Refill: 3    4. Hypertension associated with diabetes  Overview:  Hold norvasc & ARB , HCZ    Assessment & Plan:  Start low dose B blocker instead  Monitor GFR        5. Inadequately controlled diabetes mellitus  Overview:  On metformin    Assessment & Plan:  Hold metformin b/o renal insuff  Start low dose lantus at night  Check BS daily  Inc insulin by few units weekly for FBS over 120-150      Other orders  -     insulin glargine (LANTUS U-100 INSULIN) 100 unit/mL injection; Inject 15 Units into the skin every evening.  Dispense: 15 mL; Refill: 2  -     metoprolol tartrate (LOPRESSOR) 25 MG tablet; Take 1 tablet (25 mg total) by mouth 2 (two) times daily.  Dispense: 60 tablet; Refill: 11  -     pen needle, diabetic (NOVOFINE 32) 32 gauge x 1/4" Ndle; 1 Units by Misc.(Non-Drug; Combo Route) route every evening.  Dispense: 100 each; Refill: 2              "

## 2024-02-15 ENCOUNTER — OFFICE VISIT (OUTPATIENT)
Dept: FAMILY MEDICINE | Facility: CLINIC | Age: 79
End: 2024-02-15
Payer: MEDICARE

## 2024-02-15 VITALS
SYSTOLIC BLOOD PRESSURE: 110 MMHG | WEIGHT: 187.69 LBS | OXYGEN SATURATION: 97 % | DIASTOLIC BLOOD PRESSURE: 80 MMHG | HEART RATE: 72 BPM | RESPIRATION RATE: 18 BRPM | HEIGHT: 72 IN | BODY MASS INDEX: 25.42 KG/M2

## 2024-02-15 DIAGNOSIS — N40.0 BENIGN PROSTATIC HYPERPLASIA WITHOUT LOWER URINARY TRACT SYMPTOMS: ICD-10-CM

## 2024-02-15 DIAGNOSIS — E11.65 INADEQUATELY CONTROLLED DIABETES MELLITUS: Primary | ICD-10-CM

## 2024-02-15 DIAGNOSIS — E11.59 HYPERTENSION ASSOCIATED WITH DIABETES: ICD-10-CM

## 2024-02-15 DIAGNOSIS — I10 ESSENTIAL HYPERTENSION, BENIGN: ICD-10-CM

## 2024-02-15 DIAGNOSIS — I15.2 HYPERTENSION ASSOCIATED WITH DIABETES: ICD-10-CM

## 2024-02-15 DIAGNOSIS — Z00.00 PREVENTATIVE HEALTH CARE: ICD-10-CM

## 2024-02-15 DIAGNOSIS — E11.9 TYPE 2 DIABETES MELLITUS WITHOUT COMPLICATION, WITHOUT LONG-TERM CURRENT USE OF INSULIN: ICD-10-CM

## 2024-02-15 PROCEDURE — 99214 OFFICE O/P EST MOD 30 MIN: CPT | Mod: S$GLB,,, | Performed by: INTERNAL MEDICINE

## 2024-02-15 RX ORDER — METFORMIN HYDROCHLORIDE 500 MG/1
500 TABLET, EXTENDED RELEASE ORAL NIGHTLY
Qty: 90 TABLET | Refills: 3 | Status: SHIPPED | OUTPATIENT
Start: 2024-02-15 | End: 2025-02-14

## 2024-02-15 RX ORDER — DULAGLUTIDE 0.75 MG/.5ML
0.75 INJECTION, SOLUTION SUBCUTANEOUS
Qty: 4 PEN | Refills: 0 | Status: SHIPPED | OUTPATIENT
Start: 2024-02-15 | End: 2024-03-14

## 2024-02-15 NOTE — PROGRESS NOTES
Subjective:       Patient ID: Deniz Horowitz is a 78 y.o. male.    Chief Complaint: Follow-up (1 clarke f/u ) and Hypertension (BP high )      Patient is established already .......... presents today for a f/u visit , to discuss labs results and for management of the chronic conditions jean DM, BP        Follow-up  This is a chronic problem. The current episode started more than 1 year ago. The problem occurs constantly. The problem has been gradually improving. Pertinent negatives include no fever. The symptoms are aggravated by eating (ETOH). The treatment provided mild relief.   Hypertension      Review of Systems   Constitutional:  Negative for activity change, appetite change and fever.   Respiratory: Negative.     Cardiovascular: Negative.    Gastrointestinal: Negative.    Musculoskeletal: Negative.          Objective:      Physical Exam  Vitals and nursing note reviewed.   Constitutional:       Appearance: Normal appearance.   Cardiovascular:      Rate and Rhythm: Normal rate and regular rhythm.      Pulses: Normal pulses.      Heart sounds: Normal heart sounds. No murmur heard.     No friction rub. No gallop.   Pulmonary:      Effort: Pulmonary effort is normal.      Breath sounds: Normal breath sounds. No wheezing.   Skin:     General: Skin is warm and dry.   Neurological:      Mental Status: He is alert.   Psychiatric:         Mood and Affect: Mood normal.         Assessment:       1. Inadequately controlled diabetes mellitus  Overview:  On metformin    Orders:  -     dulaglutide (TRULICITY) 0.75 mg/0.5 mL pen injector; Inject 0.75 mg into the skin every 7 days.  Dispense: 4 pen ; Refill: 0  -     Ambulatory referral/consult to Diabetes Education; Future; Expected date: 02/22/2024    2. Type 2 diabetes mellitus without complication, without long-term current use of insulin  Overview:  BS still elevated at home    Assessment & Plan:  Diet d/w patient , son  T/c diab educ  Restart metformin   Add  trulicity  Inc lantus if FBS over 120      Orders:  -     metFORMIN (GLUCOPHAGE-XR) 500 MG ER 24hr tablet; Take 1 tablet (500 mg total) by mouth every evening.  Dispense: 90 tablet; Refill: 3  -     Comprehensive Metabolic Panel; Future; Expected date: 02/15/2024  -     Hemoglobin A1C; Standing    3. Essential hypertension, benign  -     Comprehensive Metabolic Panel; Future; Expected date: 02/15/2024    4. Hypertension associated with diabetes  Overview:  Hold norvasc & ARB , HCZ    Assessment & Plan:  Cont metoprolol        5. Preventative health care  Overview:  See below    Assessment & Plan:   I gave the patient written recommendations re the outstanding vaccinations.  Get eye exam      6. Benign prostatic hyperplasia without lower urinary tract symptoms  -     Prostate Specific Antigen, Diagnostic; Future; Expected date: 02/15/2024           Plan:       1. Inadequately controlled diabetes mellitus  Overview:  On metformin    Orders:  -     dulaglutide (TRULICITY) 0.75 mg/0.5 mL pen injector; Inject 0.75 mg into the skin every 7 days.  Dispense: 4 pen ; Refill: 0  -     Ambulatory referral/consult to Diabetes Education; Future; Expected date: 02/22/2024    2. Type 2 diabetes mellitus without complication, without long-term current use of insulin  Overview:  BS still elevated at home    Assessment & Plan:  Diet d/w patient , son  T/c diab educ  Restart metformin   Add trulicity  Inc lantus if FBS over 120      Orders:  -     metFORMIN (GLUCOPHAGE-XR) 500 MG ER 24hr tablet; Take 1 tablet (500 mg total) by mouth every evening.  Dispense: 90 tablet; Refill: 3  -     Comprehensive Metabolic Panel; Future; Expected date: 02/15/2024  -     Hemoglobin A1C; Standing    3. Essential hypertension, benign  -     Comprehensive Metabolic Panel; Future; Expected date: 02/15/2024    4. Hypertension associated with diabetes  Overview:  Hold norvasc & ARB , HCZ    Assessment & Plan:  Cont metoprolol        5. Preventative health  care  Overview:  See below    Assessment & Plan:   I gave the patient written recommendations re the outstanding vaccinations.  Get eye exam      6. Benign prostatic hyperplasia without lower urinary tract symptoms  -     Prostate Specific Antigen, Diagnostic; Future; Expected date: 02/15/2024

## 2024-02-15 NOTE — ASSESSMENT & PLAN NOTE
Diet d/w patient , son  T/c diab educ  Restart metformin   Add trulicity  Inc lantus if FBS over 120

## 2024-02-27 ENCOUNTER — PATIENT OUTREACH (OUTPATIENT)
Dept: DIABETES | Facility: CLINIC | Age: 79
End: 2024-02-27
Payer: MEDICARE

## 2024-03-01 NOTE — PROGRESS NOTES
Left message asking patient to reschedule diabetes education and provided Patient  phone number for scheduling, if desired.

## 2024-05-20 PROBLEM — Z00.00 PREVENTATIVE HEALTH CARE: Status: RESOLVED | Noted: 2024-02-15 | Resolved: 2024-05-20

## 2024-07-31 ENCOUNTER — TELEPHONE (OUTPATIENT)
Dept: FAMILY MEDICINE | Facility: CLINIC | Age: 79
End: 2024-07-31
Payer: MEDICARE

## 2024-07-31 NOTE — TELEPHONE ENCOUNTER
----- Message from Rudolph Marques MD sent at 7/30/2024  6:21 PM CDT -----  Regarding: f/u  Pls schedule a f/u visit for refills  Ty

## 2025-02-17 ENCOUNTER — PATIENT OUTREACH (OUTPATIENT)
Dept: ADMINISTRATIVE | Facility: HOSPITAL | Age: 80
End: 2025-02-17
Payer: MEDICARE

## 2025-02-17 DIAGNOSIS — E11.69 HYPERLIPIDEMIA ASSOCIATED WITH TYPE 2 DIABETES MELLITUS: Primary | ICD-10-CM

## 2025-02-17 DIAGNOSIS — E11.9 TYPE 2 DIABETES MELLITUS WITHOUT COMPLICATION, WITHOUT LONG-TERM CURRENT USE OF INSULIN: ICD-10-CM

## 2025-02-17 DIAGNOSIS — E78.5 HYPERLIPIDEMIA ASSOCIATED WITH TYPE 2 DIABETES MELLITUS: Primary | ICD-10-CM

## 2025-02-17 NOTE — PROGRESS NOTES
[facility] Review & Patient Outreach Details      Contacted patient by phone:    Reviewed with patient/ Follow up date noted    Hypertension Registry:    Medication Management  Patient compliant    Uncontrolled Blood Pressure  Primary Care office visit scheduled    Diabetes Registry:    Medication Management  Patient compliant    Uncontrolled Diabetes   A1C lab test order placed and scheduled    Statin Adherence:    Medication Mangement  Patient compliant    Social Drivers Of Health:    Food, Finance, & Transportaion reviewed and/or documented  Smoking Cessation reviewed and/or documented  Referral Declined  OPCM offered if needed  Patient Declined      Primary Care Visit:  Primary Care Office Visit Scheduled            [facility] Enrollment:  Patient declined    Care Gaps Reviewed  Israel Rinaldi schedules all of his appointments and manages his medications. Gentry was at work, he is a , and could not give anabelle B/P. Gentry stated that it has been years since his father had an Eye Exam. He will check with his dad's friend who is an Optometrist about scheduling an appointment.     Care Team Updated    CCC added to the team and relationship change to Care Coordinator

## 2025-02-17 NOTE — PROGRESS NOTES
Population Health Chart Review & Patient Outreach Details      Additional [facility] Health Notes:               Updates Requested / Reviewed:      Updated Care Coordination Note, Care Everywhere, , and Immunizations Reconciliation Completed or Queried: [facility] Topics Overdue:      VB Score: 5     Urine Screening  Eye Exam  Hemoglobin A1c  Lipid Panel  Uncontrolled BP    Influenza Vaccine  Pneumonia Vaccine  Tetanus Vaccine  Shingles/Zoster Vaccine  RSV Vaccine                  Health Maintenance Topic(s) Outreach Outcomes & Actions Taken:    Primary Care Appt - Outreach Outcomes & Actions Taken  : Primary Care Appt Scheduled    Lab(s) - Outreach Outcomes & Actions Taken  : Overdue Lab(s) Ordered and Overdue Lab(s) Scheduled    Eye Exam - Outreach Outcomes & Actions Taken  : Patients claudia Rinaldi stated that it has been years since pt has completed one and will try to schedule him one    Blood Pressure - Outreach Outcomes & Actions Taken  : Primary Care Follow Up Visit Scheduled

## 2025-02-21 DIAGNOSIS — Z00.00 ENCOUNTER FOR MEDICARE ANNUAL WELLNESS EXAM: ICD-10-CM

## 2025-03-31 PROCEDURE — 82374 ASSAY BLOOD CARBON DIOXIDE: CPT | Performed by: INTERNAL MEDICINE

## 2025-03-31 PROCEDURE — 80061 LIPID PANEL: CPT | Performed by: INTERNAL MEDICINE

## 2025-03-31 PROCEDURE — 83036 HEMOGLOBIN GLYCOSYLATED A1C: CPT | Performed by: INTERNAL MEDICINE

## 2025-04-01 ENCOUNTER — OFFICE VISIT (OUTPATIENT)
Dept: FAMILY MEDICINE | Facility: CLINIC | Age: 80
End: 2025-04-01
Payer: MEDICARE

## 2025-04-01 VITALS
DIASTOLIC BLOOD PRESSURE: 70 MMHG | HEIGHT: 72 IN | SYSTOLIC BLOOD PRESSURE: 124 MMHG | OXYGEN SATURATION: 98 % | WEIGHT: 179.81 LBS | BODY MASS INDEX: 24.35 KG/M2 | HEART RATE: 108 BPM

## 2025-04-01 DIAGNOSIS — E78.5 HYPERLIPIDEMIA ASSOCIATED WITH TYPE 2 DIABETES MELLITUS: ICD-10-CM

## 2025-04-01 DIAGNOSIS — I48.91 ATRIAL FIBRILLATION, UNSPECIFIED TYPE: Primary | ICD-10-CM

## 2025-04-01 DIAGNOSIS — E78.2 MIXED HYPERLIPIDEMIA: ICD-10-CM

## 2025-04-01 DIAGNOSIS — N18.31 STAGE 3A CHRONIC KIDNEY DISEASE: ICD-10-CM

## 2025-04-01 DIAGNOSIS — E11.69 HYPERLIPIDEMIA ASSOCIATED WITH TYPE 2 DIABETES MELLITUS: ICD-10-CM

## 2025-04-01 DIAGNOSIS — N18.32 STAGE 3B CHRONIC KIDNEY DISEASE: ICD-10-CM

## 2025-04-01 DIAGNOSIS — E11.9 TYPE 2 DIABETES MELLITUS WITHOUT COMPLICATION, WITHOUT LONG-TERM CURRENT USE OF INSULIN: ICD-10-CM

## 2025-04-01 DIAGNOSIS — E11.65 INADEQUATELY CONTROLLED DIABETES MELLITUS: ICD-10-CM

## 2025-04-01 DIAGNOSIS — I49.9 IRREGULAR HEART BEAT: ICD-10-CM

## 2025-04-01 PROBLEM — N18.30 STAGE 3 CHRONIC KIDNEY DISEASE: Status: ACTIVE | Noted: 2023-07-03

## 2025-04-01 PROCEDURE — 99214 OFFICE O/P EST MOD 30 MIN: CPT | Mod: S$GLB,,, | Performed by: INTERNAL MEDICINE

## 2025-04-01 PROCEDURE — G2211 COMPLEX E/M VISIT ADD ON: HCPCS | Mod: S$GLB,,, | Performed by: INTERNAL MEDICINE

## 2025-04-01 RX ORDER — METOPROLOL TARTRATE 25 MG/1
25 TABLET, FILM COATED ORAL 2 TIMES DAILY
Qty: 180 TABLET | Refills: 1 | Status: SHIPPED | OUTPATIENT
Start: 2025-04-01 | End: 2025-09-28

## 2025-04-01 RX ORDER — METFORMIN HYDROCHLORIDE 500 MG/1
500 TABLET, EXTENDED RELEASE ORAL NIGHTLY
Qty: 90 TABLET | Refills: 1 | Status: SHIPPED | OUTPATIENT
Start: 2025-04-01 | End: 2025-09-28

## 2025-04-01 RX ORDER — SIMVASTATIN 40 MG/1
40 TABLET, FILM COATED ORAL NIGHTLY
Qty: 90 TABLET | Refills: 1 | Status: SHIPPED | OUTPATIENT
Start: 2025-04-01 | End: 2025-09-28

## 2025-04-01 NOTE — ASSESSMENT & PLAN NOTE
Continue plan of care  In crease hydration  Abstain from alcohol and carbonated drinks  Decrease use of anti-inflammatories  Monitor

## 2025-04-01 NOTE — PATIENT INSTRUCTIONS
Please get the following vaccines from local pharmacy:    Tetanus  Shingles  RSV  COVID  Prevnar 20  Flu    Get a routine diab exam

## 2025-04-01 NOTE — PROGRESS NOTES
Subjective:       Patient ID: Deniz Horowitz is a 79 y.o. male.    Chief Complaint: Follow-up      History of Present Illness    CHIEF COMPLAINT:  Deniz presents for a follow-up visit after a prolonged absence, with a focus on discussing recent improvements in his health status, particularly related to diabetes management.    HPI:  Deniz returns for a follow-up visit after approximately a year. He has significant improvements in his health, particularly in diabetes management. His blood sugar have decreased from 320 to 146, and his A1c has improved from nearly 11 to 6.5. These improvements are attributed largely to his decision to discontinue alcohol consumption. His daughter reports he previously consumed 2 alcoholic beverages daily.    His kidney function has improved, with his GFR increasing by almost 2 points. His cholesterol levels are reported as good. He has been managing his condition primarily with Metformin (daily), Simvastatin (at night), and Metoprolol (twice daily). He had previously been on Lantus (insulin) shots at nighttime, but has not been using them for a while. He has not been taking Trulicity, contrary to what was initially thought.    He reports never having been evaluated by a cardiologist for any heart-related issues. He has not had a diabetic eye exam for years, with his last eye care provider being an optometrist who was a close acquaintance.    MEDICATIONS:  Deniz is on Metformin daily, Simvastatin daily at night, and Metoprolol twice daily. He has discontinued Lantus (insulin) shots at nighttime, though the timeframe is not specified. Deniz also discontinued Trulicity, but states he was never on this medication.    MEDICAL HISTORY:  Deniz has a history of diabetes. There is a mention of atrial fibrillation in his history, but he does not recall this diagnosis.    FAMILY HISTORY:  Family history is significant for the patient's Uncle Logan, who has undergone multiple valve  replacements.    TEST RESULTS:  Deniz's recent lab results show improvement. His GFR has improved by almost 2 points. His blood sugar, previously at 320, has recently decreased to 146. His A1c has significantly improved from almost 11 to 6.5. Deniz's cholesterol levels are reported as good, and his kidney function has shown improvement.    SOCIAL HISTORY:  Alcohol: Recently discontinued, previously consumed 2 alcoholic beverages daily         Review of Systems   Constitutional:  Negative for activity change, appetite change and fever.   Respiratory: Negative.     Cardiovascular: Negative.    Gastrointestinal: Negative.    Musculoskeletal: Negative.          Objective:      Physical Exam  Vitals and nursing note reviewed.   Constitutional:       Appearance: Normal appearance.   Cardiovascular:      Rate and Rhythm: Normal rate. Rhythm irregular.      Pulses: Normal pulses.      Heart sounds: Normal heart sounds. No murmur heard.     No friction rub. No gallop.   Pulmonary:      Effort: Pulmonary effort is normal.      Breath sounds: Normal breath sounds. No wheezing.   Abdominal:      General: Abdomen is flat. Bowel sounds are normal.      Palpations: Abdomen is soft.   Musculoskeletal:         General: Normal range of motion.   Skin:     General: Skin is warm and dry.   Neurological:      General: No focal deficit present.      Mental Status: He is alert and oriented to person, place, and time.   Psychiatric:         Mood and Affect: Mood normal.         Assessment:       1. Atrial fibrillation, unspecified type  -     Ambulatory referral/consult to Cardiology; Future; Expected date: 04/08/2025    2. Type 2 diabetes mellitus without complication, without long-term current use of insulin  Overview:  BS still elevated at home    Orders:  -     metFORMIN (GLUCOPHAGE-XR) 500 MG ER 24hr tablet; Take 1 tablet (500 mg total) by mouth every evening.  Dispense: 90 tablet; Refill: 1    3. Hyperlipidemia associated with  type 2 diabetes mellitus  Overview:  On zocor    Assessment & Plan:  At goal    Orders:  -     simvastatin (ZOCOR) 40 MG tablet; Take 1 tablet (40 mg total) by mouth every evening.  Dispense: 90 tablet; Refill: 1    4. Inadequately controlled diabetes mellitus  Overview:  On metformin    Assessment & Plan:  Much improved  Stopped ETOH  Off insulin  Never started Trulicity  Monitor      5. Irregular heart beat  Overview:  Possible atrial fibrillation    Assessment & Plan:  Referral to cardiology      6. Stage 3a chronic kidney disease  Overview:  Much improved GFR    Assessment & Plan:  Continue plan of care  In crease hydration  Abstain from alcohol and carbonated drinks  Decrease use of anti-inflammatories  Monitor      7. Mixed hyperlipidemia  Overview:  On zocor    Assessment & Plan:  At goal      8. Stage 3b chronic kidney disease  Overview:  Much improved GFR    Assessment & Plan:  Continue plan of care  In crease hydration  Abstain from alcohol and carbonated drinks  Decrease use of anti-inflammatories  Monitor      Other orders  -     metoprolol tartrate (LOPRESSOR) 25 MG tablet; Take 1 tablet (25 mg total) by mouth 2 (two) times daily.  Dispense: 180 tablet; Refill: 1           Plan:       Assessment & Plan    E11.59, I15.2 Type 2 diabetes mellitus with other circulatory complications  N18.32 Stage 3b chronic kidney disease  I48.91 Unspecified atrial fibrillation  Z81.1 Family history of alcohol abuse and dependence  Z79.84 Long term (current) use of oral hypoglycemic drugs  Z79.01 Long term (current) use of anticoagulants  Z94.0 Kidney transplant status    IMPRESSION:  - Diabetes management significantly improved: A1c dropped from nearly 11 to 6.5, glucose decreased from 320 to 146.  - Renal function improved: GFR increased by almost 2 points.  - Irregular heartbeat noted during exam, considering potential a-fib.    TYPE 2 DIABETES MELLITUS WITH OTHER CIRCULATORY COMPLICATIONS:  - Noted significant  improvement in diabetes management: A1c decreased from 11 to 6.5, and glucose levels reduced from 320 to 146 mg/dL.  - Continued Metformin daily and discontinued insulin and Trulicity due to improved glycemic control.  - Advised continuing current management plan.    STAGE 3B CHRONIC KIDNEY DISEASE:  - Noted improvement in renal function, with GFR increasing by almost 2 points based on recent lab results.  - Continued Metformin despite history of kidney disease, likely due to improved renal function.    ATRIAL FIBRILLATION:  - Detected irregular heartbeat during physical exam.  - Referred the patient to cardiologist Dr. Kapoor for evaluation of potential atrial fibrillation, as the patient has not previously consulted a cardiologist for cardiac issues.  - Considered prescribing anticoagulation therapy, including newer anticoagulants as potential treatment options.  - Noted that the patient does not recall taking anticoagulants previously.  - Continued Metoprolol 2 times daily.    ALCOHOL USE:  - Noted that the patient has significantly reduced alcohol consumption from approximately 2 cocktails daily.  - Acknowledged the positive health impact of this reduction.    DIABETES MANAGEMENT:  - Continued Metformin 1 time daily and refilled the prescription.    OTHER MEDICATIONS AND RECOMMENDATIONS:  - Continued Simvastatin 1 time daily at night.  - Recommend getting recommended vaccines listed in the visit summary (including flu, COVID, tetanus, shingles, RSV).    FOLLOW-UP:  - Follow up in 6 months.       Deniz was seen today for follow-up.    Diagnoses and all orders for this visit:    Atrial fibrillation, unspecified type  -     Ambulatory referral/consult to Cardiology; Future    Type 2 diabetes mellitus without complication, without long-term current use of insulin  -     metFORMIN (GLUCOPHAGE-XR) 500 MG ER 24hr tablet; Take 1 tablet (500 mg total) by mouth every evening.    Hyperlipidemia associated with type 2  diabetes mellitus  -     simvastatin (ZOCOR) 40 MG tablet; Take 1 tablet (40 mg total) by mouth every evening.    Inadequately controlled diabetes mellitus    Irregular heart beat    Stage 3a chronic kidney disease    Mixed hyperlipidemia    Stage 3b chronic kidney disease    Other orders  -     metoprolol tartrate (LOPRESSOR) 25 MG tablet; Take 1 tablet (25 mg total) by mouth 2 (two) times daily.          Visit today included increased complexity associated with the care of the episodic problem.   I addressed and managing the longitudinal care of the patient due to the serious and/or complex managed problem(s).  .

## 2025-07-21 ENCOUNTER — PATIENT MESSAGE (OUTPATIENT)
Dept: CARDIOLOGY | Facility: CLINIC | Age: 80
End: 2025-07-21
Payer: MEDICARE